# Patient Record
Sex: FEMALE | Race: WHITE | NOT HISPANIC OR LATINO | Employment: OTHER | URBAN - METROPOLITAN AREA
[De-identification: names, ages, dates, MRNs, and addresses within clinical notes are randomized per-mention and may not be internally consistent; named-entity substitution may affect disease eponyms.]

---

## 2023-09-10 PROBLEM — I10 HYPERTENSION: Status: ACTIVE | Noted: 2023-09-10

## 2023-09-10 PROBLEM — N32.89 BLADDER SPASM: Status: ACTIVE | Noted: 2023-09-10

## 2023-09-10 PROBLEM — R60.0 LOWER EXTREMITY EDEMA: Status: ACTIVE | Noted: 2023-09-10

## 2023-09-10 PROBLEM — E03.9 HYPOTHYROIDISM: Status: ACTIVE | Noted: 2023-09-10

## 2023-09-10 PROBLEM — G47.9 SLEEP DIFFICULTIES: Status: ACTIVE | Noted: 2023-09-10

## 2023-09-10 PROBLEM — F32.A DEPRESSION: Status: ACTIVE | Noted: 2023-09-10

## 2023-09-10 PROBLEM — E53.8 VITAMIN B12 DEFICIENCY: Status: ACTIVE | Noted: 2023-09-10

## 2023-09-10 PROBLEM — N32.81 OVERACTIVE BLADDER: Status: ACTIVE | Noted: 2023-09-10

## 2023-09-10 PROBLEM — C50.919 ADENOCARCINOMA OF BREAST (MULTI): Status: ACTIVE | Noted: 2023-09-10

## 2023-09-10 PROBLEM — E11.9 DIABETES MELLITUS TYPE 2, DIET-CONTROLLED (MULTI): Status: ACTIVE | Noted: 2023-09-10

## 2023-09-10 PROBLEM — E78.5 HYPERLIPIDEMIA: Status: ACTIVE | Noted: 2023-09-10

## 2023-09-10 PROBLEM — R73.02 IMPAIRED GLUCOSE TOLERANCE: Status: ACTIVE | Noted: 2023-09-10

## 2023-09-10 RX ORDER — ROSUVASTATIN CALCIUM 20 MG/1
1 TABLET, COATED ORAL NIGHTLY
COMMUNITY
Start: 2017-07-12 | End: 2023-11-06

## 2023-09-10 RX ORDER — CHOLECALCIFEROL (VITAMIN D3) 50 MCG
TABLET ORAL
COMMUNITY

## 2023-09-10 RX ORDER — TROSPIUM CHLORIDE 20 MG/1
1 TABLET, FILM COATED ORAL DAILY
COMMUNITY
Start: 2022-10-25

## 2023-09-10 RX ORDER — TRIAMTERENE/HYDROCHLOROTHIAZID 37.5-25 MG
TABLET ORAL
COMMUNITY
Start: 2016-07-06 | End: 2023-11-27 | Stop reason: ALTCHOICE

## 2023-09-10 RX ORDER — TRAZODONE HYDROCHLORIDE 50 MG/1
TABLET ORAL
COMMUNITY
Start: 2018-05-07

## 2023-09-10 RX ORDER — VALSARTAN 80 MG/1
1 TABLET ORAL 2 TIMES DAILY
COMMUNITY
Start: 2014-10-28 | End: 2023-10-24 | Stop reason: SDUPTHER

## 2023-09-10 RX ORDER — VIBEGRON 75 MG/1
TABLET, FILM COATED ORAL
COMMUNITY

## 2023-09-10 RX ORDER — CYANOCOBALAMIN 1000 UG/ML
1000 INJECTION, SOLUTION INTRAMUSCULAR; SUBCUTANEOUS
COMMUNITY
Start: 2018-07-17

## 2023-09-10 RX ORDER — LEVOTHYROXINE SODIUM 125 UG/1
TABLET ORAL
COMMUNITY
Start: 2021-10-21

## 2023-09-10 RX ORDER — LEVOTHYROXINE SODIUM 137 UG/1
TABLET ORAL
COMMUNITY
Start: 2018-05-07

## 2023-10-24 DIAGNOSIS — I10 ESSENTIAL (PRIMARY) HYPERTENSION: ICD-10-CM

## 2023-10-24 RX ORDER — VALSARTAN 80 MG/1
80 TABLET ORAL 2 TIMES DAILY
Qty: 180 TABLET | Refills: 3 | Status: SHIPPED | OUTPATIENT
Start: 2023-10-24 | End: 2023-11-27 | Stop reason: SDUPTHER

## 2023-10-30 ENCOUNTER — APPOINTMENT (OUTPATIENT)
Dept: CARDIOLOGY | Facility: CLINIC | Age: 88
End: 2023-10-30

## 2023-11-27 ENCOUNTER — OFFICE VISIT (OUTPATIENT)
Dept: CARDIOLOGY | Facility: CLINIC | Age: 88
End: 2023-11-27
Payer: MEDICARE

## 2023-11-27 VITALS
WEIGHT: 159.9 LBS | HEART RATE: 78 BPM | HEIGHT: 66 IN | SYSTOLIC BLOOD PRESSURE: 145 MMHG | OXYGEN SATURATION: 94 % | BODY MASS INDEX: 25.7 KG/M2 | DIASTOLIC BLOOD PRESSURE: 86 MMHG

## 2023-11-27 DIAGNOSIS — I10 HYPERTENSION, UNSPECIFIED TYPE: Primary | ICD-10-CM

## 2023-11-27 DIAGNOSIS — I10 ESSENTIAL (PRIMARY) HYPERTENSION: ICD-10-CM

## 2023-11-27 DIAGNOSIS — E78.5 HYPERLIPIDEMIA, UNSPECIFIED: ICD-10-CM

## 2023-11-27 PROCEDURE — 93005 ELECTROCARDIOGRAM TRACING: CPT | Mod: PO | Performed by: INTERNAL MEDICINE

## 2023-11-27 PROCEDURE — 99214 OFFICE O/P EST MOD 30 MIN: CPT | Performed by: INTERNAL MEDICINE

## 2023-11-27 PROCEDURE — 93010 ELECTROCARDIOGRAM REPORT: CPT | Performed by: INTERNAL MEDICINE

## 2023-11-27 PROCEDURE — 3079F DIAST BP 80-89 MM HG: CPT | Performed by: INTERNAL MEDICINE

## 2023-11-27 PROCEDURE — 1159F MED LIST DOCD IN RCRD: CPT | Performed by: INTERNAL MEDICINE

## 2023-11-27 PROCEDURE — 3077F SYST BP >= 140 MM HG: CPT | Performed by: INTERNAL MEDICINE

## 2023-11-27 PROCEDURE — 1125F AMNT PAIN NOTED PAIN PRSNT: CPT | Performed by: INTERNAL MEDICINE

## 2023-11-27 PROCEDURE — 99214 OFFICE O/P EST MOD 30 MIN: CPT | Mod: PO,25 | Performed by: INTERNAL MEDICINE

## 2023-11-27 RX ORDER — AMLODIPINE BESYLATE 5 MG/1
5 TABLET ORAL DAILY
COMMUNITY
Start: 2023-11-06 | End: 2023-11-27 | Stop reason: WASHOUT

## 2023-11-27 RX ORDER — AMLODIPINE BESYLATE 5 MG/1
5 TABLET ORAL DAILY
Qty: 90 TABLET | Refills: 3 | Status: SHIPPED | OUTPATIENT
Start: 2023-11-27

## 2023-11-27 RX ORDER — ROSUVASTATIN CALCIUM 20 MG/1
20 TABLET, COATED ORAL NIGHTLY
Qty: 90 TABLET | Refills: 3 | Status: SHIPPED | OUTPATIENT
Start: 2023-11-27

## 2023-11-27 RX ORDER — VALSARTAN 80 MG/1
80 TABLET ORAL 2 TIMES DAILY
Qty: 180 TABLET | Refills: 3 | Status: SHIPPED | OUTPATIENT
Start: 2023-11-27

## 2023-11-27 ASSESSMENT — PAIN SCALES - GENERAL: PAINLEVEL: 2

## 2023-11-27 ASSESSMENT — PATIENT HEALTH QUESTIONNAIRE - PHQ9
2. FEELING DOWN, DEPRESSED OR HOPELESS: NOT AT ALL
SUM OF ALL RESPONSES TO PHQ9 QUESTIONS 1 AND 2: 0
1. LITTLE INTEREST OR PLEASURE IN DOING THINGS: NOT AT ALL

## 2023-11-27 ASSESSMENT — ENCOUNTER SYMPTOMS
OCCASIONAL FEELINGS OF UNSTEADINESS: 0
LOSS OF SENSATION IN FEET: 0
DIAPHORESIS: 0
WEIGHT LOSS: 0
ORTHOPNEA: 0
DECREASED APPETITE: 0
NEAR-SYNCOPE: 0
FEVER: 0
DYSPNEA ON EXERTION: 0
DEPRESSION: 0

## 2023-11-27 ASSESSMENT — COLUMBIA-SUICIDE SEVERITY RATING SCALE - C-SSRS
1. IN THE PAST MONTH, HAVE YOU WISHED YOU WERE DEAD OR WISHED YOU COULD GO TO SLEEP AND NOT WAKE UP?: NO
2. HAVE YOU ACTUALLY HAD ANY THOUGHTS OF KILLING YOURSELF?: NO
6. HAVE YOU EVER DONE ANYTHING, STARTED TO DO ANYTHING, OR PREPARED TO DO ANYTHING TO END YOUR LIFE?: NO

## 2023-11-27 NOTE — PROGRESS NOTES
Primary Care Physician: Olga Person  Date of Visit: 2023  9:20 AM EST  Location of visit: [facility] [place] [address]   Last office visit: 2022     Chief Complaint:     Hypertension     HPI/Summary  Citlaly Miller is a 89 y.o. female who presents for followup cardiology evaluation.     The patient presents with hypertension, hyperlipidemia and aortic sclerosis without stenosis.  There is a remote history of breast cancer, with treatment including partial mastectomy and hormonal therapy.  No chemotherapy.  An echocardiogram in  showed normal LV systolic function, mild LVH no significant valvular heart disease.  There is a history of diabetes, diet controlled.    Aspirin has been withdrawn.  She continues on rosuvastatin, valsartan, and triamterene-HCTZ.    The patient is doing well.  She takes her blood pressure at home, and readings are at goal, after the addition of amlodipine.  No chest pain, no significant palpitations.  She did get treated for COVID-19 infection a few months ago.  Specialty Problems          Cardiology Problems    Hyperlipidemia    Hypertension        Past Medical History:   Diagnosis Date    Personal history of malignant neoplasm of breast 2014    History of malignant neoplasm of female breast          Past Surgical History:   Procedure Laterality Date    OTHER SURGICAL HISTORY  2014    Left Breast Partial Mastectomy            Social History     Tobacco Use    Smoking status: Former     Types: Cigarettes     Quit date: 1960     Years since quittin.9    Smokeless tobacco: Never   Substance Use Topics    Alcohol use: Yes     Alcohol/week: 1.0 standard drink of alcohol     Types: 1 Glasses of wine per week     Comment: 1 glass of wine daily    Drug use: Never        Physical Activity: Not on file            Allergies   Allergen Reactions    Ciprofloxacin Unknown    Hydrocodone Unknown    Meperidine Unknown    Oxybutynin Unknown     "Sulfamethoxazole-Trimethoprim Unknown         Current Outpatient Medications   Medication Instructions    amLODIPine (NORVASC) 5 mg, oral, Daily    AMLODIPINE BESYLATE, BULK, MISC miscellaneous    cholecalciferol (Vitamin D-3) 50 MCG (2000 UT) tablet oral    cyanocobalamin (VITAMIN B-12) 1,000 mcg, intramuscular, Every 30 days    fish oil concentrate (Omega-3) 120-180 mg capsule 1 capsule, oral, Daily    levothyroxine (Synthroid, Levoxyl) 125 mcg tablet oral    levothyroxine (Synthroid, Levoxyl) 137 mcg tablet oral    rosuvastatin (CRESTOR) 20 mg, oral, Nightly    traZODone (Desyrel) 50 mg tablet oral    triamterene-hydrochlorothiazid (Maxzide-25) 37.5-25 mg tablet oral, Daily RT    trospium (Sanctura) 20 mg tablet 1 tablet, oral, Daily    valsartan (DIOVAN) 80 mg, oral, 2 times daily    vibegron (Gemtesa) 75 mg tablet oral    vit A/vit C/vit E/zinc/copper (PRESERVISION AREDS ORAL) 1 capsule, oral, 2 times daily       Review of Systems   Constitutional: Negative for decreased appetite, diaphoresis, fever and weight loss.   Cardiovascular:  Positive for leg swelling. Negative for chest pain, dyspnea on exertion, near-syncope and orthopnea.   All other systems reviewed and are negative.       Vital Signs:  Vitals:    11/27/23 0945   BP: (!) 170/95   BP Location: Right arm   Patient Position: Sitting   BP Cuff Size: Adult   Pulse: 78   SpO2: 94%   Weight: 72.5 kg (159 lb 14.4 oz)   Height: 1.676 m (5' 6\")     Wt Readings from Last 5 Encounters:   11/27/23 72.5 kg (159 lb 14.4 oz)   02/10/23 72.3 kg (159 lb 8 oz)   10/25/22 70.9 kg (156 lb 4 oz)   10/25/22 71.2 kg (157 lb)   10/24/22 70.8 kg (156 lb)     Body mass index is 25.81 kg/m².   Home blood pressures have been well-controlled, 128/76 on average, after the addition of amlodipine.  Physical Exam:    On examination she appeared in good health and spirits. Vital signs as documented. Skin warm and dry and without overt rashes. Neck without JVD. Lungs clear. Heart " exam notable for regular rhythm, normal sounds and absence of murmurs, rubs or gallops. Abdomen unremarkable and without evidence of organomegaly, masses, or abdominal aortic enlargement. Extremities nonedematous.     Last Labs:  CMP: October 7, 2023, outside lab.  All normal except sodium 135.  CBC:   COAG:                                                                                                                      HEME/ENDO:    TSH 0.88.                CARDIAC:       Lipid panel shows cholesterol 168, HDL 78, LDL 71, triglycerides 95.                                                                                                                                                                                                                                                                                                                                                                                                                                                                                              Last Cardiology Tests:    ECG:    Performed today reviewed shows sinus rhythm with first-degree AV block, left atrial enlargement, otherwise normal.  Peer interval 252 ms.    Echo:  Echo Results:  No results found for this or any previous visit from the past 3650 days.       Cath:      Stress Test:  Stress Results:  No results found for this or any previous visit from the past 365 days.         Cardiac Imaging:        Assessment/Plan     Currently, the patient is doing well.  Blood pressures have been generally well-controlled.  She has a primary care provider locally but prefers to see us once yearly.  We reviewed outside laboratory testing which show adequate control of dyslipidemia for primary prevention.  Follow-up on as-needed basis.  Medications will be renewed.  Note repeat blood pressure 145/86 after resting.      Orders:  Orders Placed This Encounter   Procedures    ECG 12 lead (Clinic Performed)       Followup Appts:  No future appointments.        ____________________________________________________________  Himanshu Montez MD    Senior Attending Physician  [facility]  [facility]    [facility] Chair for Cardiovascular Excellence  [employer]

## 2023-12-02 LAB
ATRIAL RATE: 77 BPM
P AXIS: 61 DEGREES
P OFFSET: 164 MS
P ONSET: 94 MS
PR INTERVAL: 252 MS
Q ONSET: 220 MS
QRS COUNT: 12 BEATS
QRS DURATION: 102 MS
QT INTERVAL: 384 MS
QTC CALCULATION(BAZETT): 434 MS
QTC FREDERICIA: 417 MS
R AXIS: 58 DEGREES
T AXIS: 59 DEGREES
T OFFSET: 412 MS
VENTRICULAR RATE: 77 BPM

## 2024-02-09 ENCOUNTER — OFFICE VISIT (OUTPATIENT)
Dept: UROLOGY | Facility: CLINIC | Age: 89
End: 2024-02-09
Payer: MEDICARE

## 2024-02-09 VITALS
DIASTOLIC BLOOD PRESSURE: 83 MMHG | TEMPERATURE: 98 F | SYSTOLIC BLOOD PRESSURE: 125 MMHG | HEART RATE: 80 BPM | WEIGHT: 164 LBS | HEIGHT: 66 IN | BODY MASS INDEX: 26.36 KG/M2

## 2024-02-09 DIAGNOSIS — N32.89 BLADDER SPASM: Primary | ICD-10-CM

## 2024-02-09 DIAGNOSIS — N39.0 RECURRENT UTI: ICD-10-CM

## 2024-02-09 LAB
POC APPEARANCE, URINE: CLEAR
POC BILIRUBIN, URINE: ABNORMAL
POC BLOOD, URINE: ABNORMAL
POC COLOR, URINE: YELLOW
POC GLUCOSE, URINE: NEGATIVE MG/DL
POC KETONES, URINE: ABNORMAL MG/DL
POC LEUKOCYTES, URINE: ABNORMAL
POC NITRITE,URINE: NEGATIVE
POC PH, URINE: 5.5 PH
POC PROTEIN, URINE: NEGATIVE MG/DL
POC SPECIFIC GRAVITY, URINE: 1.02
POC UROBILINOGEN, URINE: 0.2 EU/DL

## 2024-02-09 PROCEDURE — 81002 URINALYSIS NONAUTO W/O SCOPE: CPT | Performed by: OBSTETRICS & GYNECOLOGY

## 2024-02-09 PROCEDURE — 87186 SC STD MICRODIL/AGAR DIL: CPT

## 2024-02-09 PROCEDURE — 1036F TOBACCO NON-USER: CPT | Performed by: OBSTETRICS & GYNECOLOGY

## 2024-02-09 PROCEDURE — 3074F SYST BP LT 130 MM HG: CPT | Performed by: OBSTETRICS & GYNECOLOGY

## 2024-02-09 PROCEDURE — 1159F MED LIST DOCD IN RCRD: CPT | Performed by: OBSTETRICS & GYNECOLOGY

## 2024-02-09 PROCEDURE — 87086 URINE CULTURE/COLONY COUNT: CPT

## 2024-02-09 PROCEDURE — 3079F DIAST BP 80-89 MM HG: CPT | Performed by: OBSTETRICS & GYNECOLOGY

## 2024-02-09 PROCEDURE — 1126F AMNT PAIN NOTED NONE PRSNT: CPT | Performed by: OBSTETRICS & GYNECOLOGY

## 2024-02-09 PROCEDURE — 99204 OFFICE O/P NEW MOD 45 MIN: CPT | Performed by: OBSTETRICS & GYNECOLOGY

## 2024-02-09 RX ORDER — PHENAZOPYRIDINE HYDROCHLORIDE 100 MG/1
TABLET, FILM COATED ORAL
Qty: 90 TABLET | Refills: 3 | Status: SHIPPED | OUTPATIENT
Start: 2024-02-09

## 2024-02-09 RX ORDER — NITROFURANTOIN 25; 75 MG/1; MG/1
100 CAPSULE ORAL DAILY
Qty: 90 CAPSULE | Refills: 0 | Status: SHIPPED | OUTPATIENT
Start: 2024-02-09 | End: 2024-03-11

## 2024-02-09 ASSESSMENT — PAIN SCALES - GENERAL: PAINLEVEL: 0-NO PAIN

## 2024-02-09 NOTE — PROGRESS NOTES
INITIAL EVALUATION       PROBLEM LIST:  1. Recurrent UTIs       HISTORY OF PRESENT ILLNESS:   Citlaly Miller is a 89 y.o. female who was first seen on 02/09/24 for evaluation of recurrent UTIs and OAB. She has tried many different medications for her OAB. Her best results are when she keeps her bladder empty. Her urine cultures show  e. Coli typically.    She is not currently on any medications. She is having some odd symptoms after covid. She is still using the vaginal estrogen. The last medications she was on is the Gemtesa. She has not noticed any change in her symptoms. She took the whole two month course she was prescribe and there was no change in her spasms. She adds the only change in spasms she notices is when she keeps her bladder empty. She has not tried Pyridium yet.     INTERVAL HISTORY:  History of Cdiff.     PAST MEDICAL HISTORY:  Past Medical History:   Diagnosis Date    Personal history of malignant neoplasm of breast 06/25/2014    History of malignant neoplasm of female breast       PAST SURGICAL HISTORY:  Past Surgical History:   Procedure Laterality Date    OTHER SURGICAL HISTORY  06/25/2014    Left Breast Partial Mastectomy        ALLERGIES:   Allergies   Allergen Reactions    Ciprofloxacin Unknown    Hydrocodone Unknown    Meperidine Unknown    Oxybutynin Unknown    Sulfamethoxazole-Trimethoprim Unknown        MEDICATIONS:   [unfilled]     SOCIAL HISTORY:  Patient  reports that she quit smoking about 64 years ago. Her smoking use included cigarettes. She has never used smokeless tobacco. She reports current alcohol use of about 1.0 standard drink of alcohol per week. She reports that she does not use drugs.   Social History     Socioeconomic History    Marital status:      Spouse name: Not on file    Number of children: Not on file    Years of education: Not on file    Highest education level: Not on file   Occupational History    Not on file   Tobacco Use    Smoking status: Former      "Types: Cigarettes     Quit date: 1960     Years since quittin.1    Smokeless tobacco: Never   Substance and Sexual Activity    Alcohol use: Yes     Alcohol/week: 1.0 standard drink of alcohol     Types: 1 Glasses of wine per week     Comment: 1 glass of wine daily    Drug use: Never    Sexual activity: Not on file   Other Topics Concern    Not on file   Social History Narrative    Not on file     Social Determinants of Health     Financial Resource Strain: Not on file   Food Insecurity: Not on file   Transportation Needs: Not on file   Physical Activity: Not on file   Stress: Not on file   Social Connections: Not on file   Intimate Partner Violence: Not on file   Housing Stability: Not on file       FAMILY HISTORY:  Family History   Problem Relation Name Age of Onset    Diabetes Mother      Heart failure Father      Heart disease Father      Diabetes Brother      Prostate cancer Brother         REVIEW OF SYSTEMS:  Constitutional: Negative for fever and chills. Denies anorexia, weight loss.  Eyes: Negative for visual disturbance.   Respiratory: Negative for shortness of breath.    Cardiovascular: Negative for chest pain.   Gastrointestinal: Negative for nausea and vomiting.   Genitourinary: See interval history above.  Skin: Negative for rash.   Neurological: Negative for dizziness and numbness.   Psychiatric/Behavioral: Negative for confusion and decreased concentration.     PHYSICAL EXAM:  Blood pressure 125/83, pulse 80, temperature 36.7 °C (98 °F), height 1.676 m (5' 6\"), weight 74.4 kg (164 lb).  Constitutional: Patient appears well-developed and well-nourished. No distress.    Head: Normocephalic and atraumatic.    Neck: Normal range of motion.    Cardiovascular: Normal rate.    Pulmonary/Chest: Effort normal. No respiratory distress.     Musculoskeletal: Normal range of motion.    Neurological: Alert and oriented to person, place, and time.  Psychiatric: Normal mood and affect. Behavior is normal. Thought " content normal.         Assessment:      1. Bladder spasm  POCT UA (nonautomated) manually resulted        Citlaly Miller is a 89 y.o. female with recurrent UTIs and OAB    We are going to try Macrobid 100mg for 90 days for UTI suppression. After that we will start her on Methenamine. For her bladder spasms we are going to try Pyridium 100mg PRN.     Plan:    Prescribed Macrobid 100mg 90 days  Prescribed Pyridium 100mg PRN      Scribe Attestation  By signing my name below, I, Bandar Ceron   attest that this documentation has been prepared under the direction and in the presence of Betty Stewart MD MPH.

## 2024-02-12 LAB — BACTERIA UR CULT: ABNORMAL

## 2024-03-11 DIAGNOSIS — N39.0 RECURRENT UTI: ICD-10-CM

## 2024-03-11 RX ORDER — NITROFURANTOIN 25; 75 MG/1; MG/1
100 CAPSULE ORAL DAILY
Qty: 90 CAPSULE | Refills: 0 | Status: SHIPPED | OUTPATIENT
Start: 2024-03-11

## 2024-04-19 ENCOUNTER — TELEPHONE (OUTPATIENT)
Dept: SCHEDULING | Age: 89
End: 2024-04-19
Payer: MEDICARE

## 2024-04-19 DIAGNOSIS — I10 PRIMARY HYPERTENSION: Primary | ICD-10-CM

## 2024-04-19 RX ORDER — TRIAMTERENE AND HYDROCHLOROTHIAZIDE 37.5; 25 MG/1; MG/1
1 CAPSULE ORAL EVERY MORNING
Qty: 90 CAPSULE | Refills: 1 | Status: SHIPPED | OUTPATIENT
Start: 2024-04-19

## 2024-04-19 NOTE — TELEPHONE ENCOUNTER
Reviewed meds.  Some confusion.  We started amlodipine 5 mg daily last July, but when she was in the office in November, amlodipine was not on the list and triamterene-HCTZ was on the list.  She is taking the amlodipine but not the diuretic, and blood pressures are around 130/85 on average.  Since she had tolerated the thiazide diuretic in the past, we will do triamterene-HCTZ and continue amlodipine 5 mg daily.  She will monitor her blood pressure and will be in touch.  We could reduce amlodipine to 2.5 mg daily, if necessary

## 2024-05-10 ENCOUNTER — OFFICE VISIT (OUTPATIENT)
Dept: UROLOGY | Facility: CLINIC | Age: 89
End: 2024-05-10
Payer: MEDICARE

## 2024-05-10 VITALS
TEMPERATURE: 97.5 F | WEIGHT: 165 LBS | SYSTOLIC BLOOD PRESSURE: 113 MMHG | HEIGHT: 66 IN | DIASTOLIC BLOOD PRESSURE: 71 MMHG | BODY MASS INDEX: 26.52 KG/M2 | HEART RATE: 88 BPM

## 2024-05-10 DIAGNOSIS — N39.0 RECURRENT UTI: ICD-10-CM

## 2024-05-10 DIAGNOSIS — R35.0 URINARY FREQUENCY: Primary | ICD-10-CM

## 2024-05-10 DIAGNOSIS — N32.89 BLADDER SPASM: ICD-10-CM

## 2024-05-10 LAB
APPEARANCE UR: ABNORMAL
BACTERIA #/AREA URNS AUTO: ABNORMAL /HPF
BILIRUB UR STRIP.AUTO-MCNC: NEGATIVE MG/DL
COLOR UR: YELLOW
GLUCOSE UR STRIP.AUTO-MCNC: NORMAL MG/DL
KETONES UR STRIP.AUTO-MCNC: NEGATIVE MG/DL
LEUKOCYTE ESTERASE UR QL STRIP.AUTO: ABNORMAL
MUCOUS THREADS #/AREA URNS AUTO: ABNORMAL /LPF
NITRITE UR QL STRIP.AUTO: NEGATIVE
PH UR STRIP.AUTO: 5.5 [PH]
POC APPEARANCE, URINE: ABNORMAL
POC BILIRUBIN, URINE: NEGATIVE
POC BLOOD, URINE: ABNORMAL
POC COLOR, URINE: YELLOW
POC GLUCOSE, URINE: NEGATIVE MG/DL
POC KETONES, URINE: NEGATIVE MG/DL
POC LEUKOCYTES, URINE: ABNORMAL
POC NITRITE,URINE: NEGATIVE
POC PH, URINE: 6 PH
POC PROTEIN, URINE: NEGATIVE MG/DL
POC SPECIFIC GRAVITY, URINE: 1.02
POC UROBILINOGEN, URINE: 0.2 EU/DL
PROT UR STRIP.AUTO-MCNC: NEGATIVE MG/DL
RBC # UR STRIP.AUTO: NEGATIVE /UL
RBC #/AREA URNS AUTO: ABNORMAL /HPF
SP GR UR STRIP.AUTO: 1.01
SQUAMOUS #/AREA URNS AUTO: ABNORMAL /HPF
UROBILINOGEN UR STRIP.AUTO-MCNC: NORMAL MG/DL
WBC #/AREA URNS AUTO: >50 /HPF
WBC CLUMPS #/AREA URNS AUTO: ABNORMAL /HPF

## 2024-05-10 PROCEDURE — 1159F MED LIST DOCD IN RCRD: CPT | Performed by: NURSE PRACTITIONER

## 2024-05-10 PROCEDURE — 81003 URINALYSIS AUTO W/O SCOPE: CPT | Performed by: NURSE PRACTITIONER

## 2024-05-10 PROCEDURE — 87186 SC STD MICRODIL/AGAR DIL: CPT

## 2024-05-10 PROCEDURE — 81001 URINALYSIS AUTO W/SCOPE: CPT

## 2024-05-10 PROCEDURE — 3078F DIAST BP <80 MM HG: CPT | Performed by: NURSE PRACTITIONER

## 2024-05-10 PROCEDURE — 87086 URINE CULTURE/COLONY COUNT: CPT

## 2024-05-10 PROCEDURE — G2211 COMPLEX E/M VISIT ADD ON: HCPCS | Performed by: NURSE PRACTITIONER

## 2024-05-10 PROCEDURE — 99213 OFFICE O/P EST LOW 20 MIN: CPT | Performed by: NURSE PRACTITIONER

## 2024-05-10 PROCEDURE — 1036F TOBACCO NON-USER: CPT | Performed by: NURSE PRACTITIONER

## 2024-05-10 PROCEDURE — 3074F SYST BP LT 130 MM HG: CPT | Performed by: NURSE PRACTITIONER

## 2024-05-10 RX ORDER — ONDANSETRON 4 MG/1
TABLET, FILM COATED ORAL
COMMUNITY
Start: 2024-05-07

## 2024-05-10 RX ORDER — METHENAMINE HIPPURATE 1000 MG/1
1 TABLET ORAL 2 TIMES DAILY
Qty: 180 TABLET | Refills: 3 | Status: SHIPPED | OUTPATIENT
Start: 2024-05-10 | End: 2025-05-10

## 2024-05-10 NOTE — PATIENT INSTRUCTIONS
Urine culture sent will call daughter Jyane next week w results  Finish macrobid 100 mg nightly has approximately 20 left  Then Start methenamine hippurate twice daily  Needs to be taking approx 500 mg vitamin C daily while taking methenamine hippurate  Follow up 4 mos Dr. Stewart or myself  whichever works w her schedule  Nurse line [phone]

## 2024-05-10 NOTE — PROGRESS NOTES
"05/10/24   [id number]    Recurrent UTI, OAB      Subjective      HPI Citlaly Miller is a 89 y.o. female who presents for follow up Recurrent UTI, OAB;   Last seen 10/13/23;     Saw Dr. Stewart 2/9/24 completing 90 days of Macrobid 100mg for 90 days for UTI suppression. With plant to start after that Methenamine. For her bladder spasms we was given Pyridium 100mg PRN by Dr. Stewart.     UA trace heme, sm leuk; today, discussed may be infected, not sure from dip, urine was cloudy;     Hasn't been treated for any other UTIs, never has sx w UTIs;  per patient hasn't taken much of azo as only bladder spasms prior to needing to urinate;     Has 20 days left of macrobid, hx cdiff, only diarrhea has been related to food once but felt it was food poisoning on trip where others had it as well;     Son w patient, daughter Jayne PECK assisting w health care;     From previous notes: failed trospium d/t constipation, failed Gemtesa d/t ineffective;            Objective     /71   Pulse 88   Temp 36.4 °C (97.5 °F)   Ht 1.676 m (5' 6\")   Wt 74.8 kg (165 lb)   BMI 26.63 kg/m²    Physical Exam  General: Appears comfortable and in no apparent distress, well nourished  Head: Normocephalic, atraumatic  Neck: trachea midline  Respiratory: respirations unlabored, no wheezes, and no use of accessory muscles  Cardiovascular: at rest no dyspnea, well perfused  Skin: no visible rashes or lesions  Neurologic: grossly intact, oriented to person, place, and time  Psychiatric: mood and affect appropriate  Musculoskeletal: in chair for appt. no difficulty w upper body movement      Assessment/Plan   Problem List Items Addressed This Visit          Genitourinary and Reproductive    Bladder spasm     Other Visit Diagnoses       Urinary frequency    -  Primary    Relevant Orders    POCT UA Automated manually resulted (Completed)    Post-Void Residual (Completed)    Urine Culture    Urinalysis with Reflex Microscopic    Recurrent UTI        Relevant " "Medications    methenamine hippurate (Hiprex) 1 gram tablet    Other Relevant Orders    Urine Culture    Urinalysis with Reflex Microscopic          Orders Placed This Encounter   Procedures    Post-Void Residual    Urine Culture     Standing Status:   Future     Number of Occurrences:   1     Standing Expiration Date:   5/10/2025     Order Specific Question:   Release result to [facility]     Answer:   Immediate [1]    Urinalysis with Reflex Microscopic     Standing Status:   Future     Number of Occurrences:   1     Standing Expiration Date:   5/10/2025     Order Specific Question:   Release result to [facility]     Answer:   Immediate [1]    POCT UA Automated manually resulted     Order Specific Question:   Release result to [facility]     Answer:   Immediate [1]      Urine culture sent will call daughter Jayne next week w results  Finish macrobid 100 mg nightly has approximately 20 left  Then Start methenamine hippurate twice daily  Needs to be taking approx 500 mg vitamin C daily while taking methenamine hippurate  Follow up 4 mos Dr. Stewart or myself  whichever works w her schedule  Nurse line [phone]       Siria Candelario, APRN-CNP  No results found for: \"GLUCOSE\", \"CALCIUM\", \"NA\", \"K\", \"CO2\", \"CL\", \"BUN\", \"CREATININE\"    "

## 2024-05-12 LAB — BACTERIA UR CULT: ABNORMAL

## 2024-05-13 DIAGNOSIS — N39.0 RECURRENT UTI: Primary | ICD-10-CM

## 2024-05-13 RX ORDER — CEPHALEXIN 500 MG/1
500 CAPSULE ORAL 3 TIMES DAILY
Qty: 15 CAPSULE | Refills: 0 | Status: SHIPPED | OUTPATIENT
Start: 2024-05-13 | End: 2024-05-18

## 2024-09-12 ENCOUNTER — APPOINTMENT (OUTPATIENT)
Dept: UROLOGY | Facility: CLINIC | Age: 89
End: 2024-09-12
Payer: MEDICARE

## 2024-10-10 DIAGNOSIS — I10 PRIMARY HYPERTENSION: ICD-10-CM

## 2024-10-10 RX ORDER — TRIAMTERENE AND HYDROCHLOROTHIAZIDE 37.5; 25 MG/1; MG/1
1 CAPSULE ORAL EVERY MORNING
Qty: 90 CAPSULE | Refills: 1 | Status: SHIPPED | OUTPATIENT
Start: 2024-10-10

## 2024-12-01 NOTE — PROGRESS NOTES
Primary Care Physician: Olga Person, APRN-CNP  Date of Visit: 2024  9:40 AM EST  [place] [facility] [place] [address]   Last office visit: 2023     Chief Complaint:     Hypertension, hypercholesterolemia    HPI/Summary  Citlaly Miller is a 89 y.o. female who presents for followup cardiology evaluation.     The patient presents with hypertension, hyperlipidemia and aortic sclerosis without stenosis. There is a remote history of breast cancer, with treatment including partial mastectomy and hormonal therapy. No chemotherapy. An echocardiogram in  showed normal LV systolic function, mild LVH no significant valvular heart disease. There is a history of diabetes, diet controlled.     She continues on amlodipine 5 mg daily, rosuvastatin 20 mg daily, valsartan 80 mg twice daily and Dyazide 1 capsule daily.    Stable cardiovascular status.  She still lives independently, no longer drives a car, has some help in the home.  Some short-term memory loss.  Compliant with medications.  No falls.    Specialty Problems          Cardiology Problems    Hyperlipidemia    Hypertension        Past Medical History:   Diagnosis Date    Personal history of malignant neoplasm of breast 2014    History of malignant neoplasm of female breast          Past Surgical History:   Procedure Laterality Date    OTHER SURGICAL HISTORY  2014    Left Breast Partial Mastectomy            Social History     Tobacco Use    Smoking status: Former     Current packs/day: 0.00     Types: Cigarettes     Quit date: 1960     Years since quittin.9    Smokeless tobacco: Never   Substance Use Topics    Alcohol use: Yes     Alcohol/week: 1.0 standard drink of alcohol     Types: 1 Glasses of wine per week     Comment: 1 glass of wine daily    Drug use: Never                 Allergies   Allergen Reactions    Ciprofloxacin Unknown    Hydrocodone Unknown    Meperidine Unknown    Oxybutynin Unknown    Sulfamethoxazole-Trimethoprim Unknown  "        Current Outpatient Medications   Medication Instructions    amLODIPine (NORVASC) 5 mg, oral, Daily    cholecalciferol (Vitamin D-3) 50 MCG (2000 UT) tablet Take by mouth.    cyanocobalamin (VITAMIN B-12) 1,000 mcg, Every 30 days    fish oil concentrate (Omega-3) 120-180 mg capsule 1 capsule, Daily    levothyroxine (Synthroid, Levoxyl) 125 mcg tablet Take by mouth.    levothyroxine (Synthroid, Levoxyl) 137 mcg tablet Take by mouth.    ondansetron (Zofran) 4 mg tablet     rosuvastatin (CRESTOR) 20 mg, oral, Nightly    traZODone (Desyrel) 50 mg tablet Take by mouth.    triamterene-hydrochlorothiazid (Dyazide) 37.5-25 mg capsule 1 capsule, oral, 3 times weekly    valsartan (DIOVAN) 80 mg, oral, 2 times daily    vit A/vit C/vit E/zinc/copper (PRESERVISION AREDS ORAL) 1 capsule, 2 times daily       ROS     Vital Signs:  Vitals:    12/02/24 0957   BP: 131/81   BP Location: Right arm   Patient Position: Sitting   BP Cuff Size: Large adult   Pulse: 73   SpO2: 95%   Weight: 73.2 kg (161 lb 6.4 oz)   Height: 1.689 m (5' 6.5\")     Wt Readings from Last 2 Encounters:   12/02/24 73.2 kg (161 lb 6.4 oz)   05/10/24 74.8 kg (165 lb)     Body mass index is 25.66 kg/m².       Physical Exam:    Pleasant elderly woman, not in any acute distress.  No carotid bruits.  Clear lung fields.  Heart sounds regular, with grade 2 early systolic ejection murmur along LVOT.  S2 preserved, no diastolic murmur.  No edema, no signs of PAD.     Last Labs:  CMP:No results for input(s): \"NA\", \"K\", \"CL\", \"CO2\", \"ANIONGAP\", \"BUN\", \"CREATININE\", \"EGFR\", \"GLUCOSE\" in the last 17923 hours.No results for input(s): \"ALBUMIN\", \"ALKPHOS\", \"ALT\", \"AST\", \"BILITOT\", \"LIPASE\" in the last 75458 hours.    No lab exists for component: \"CA\"  CBC:No results for input(s): \"WBC\", \"HGB\", \"HCT\", \"PLT\", \"MCV\" in the last 38671 hours.  COAG: No results for input(s): \"INR\", \"DDIMERVTE\" in the last 59687 hours.  HEME/ENDO:No results for input(s): \"FERRITIN\", \"IRONSAT\", " "\"TSH\", \"HGBA1C\" in the last 33383 hours.   CARDIAC: No results for input(s): \"LDH\", \"CKMB\", \"TROPHS\", \"BNP\" in the last 59059 hours.    No lab exists for component: \"CK\", \"CKMBP\"No results for input(s): \"CHOL\", \"LDLF\", \"HDL\", \"TRIG\" in the last 50635 hours.    Last Cardiology Tests:    ECG:    Sinus with first-degree AV block, NJ interval 260 ms.  Otherwise normal.    Echo:  Echo Results:  No results found for this or any previous visit from the past 3650 days.       Cath:      Stress Test:  Stress Results:  No results found for this or any previous visit from the past 365 days.         Cardiac Imaging:        Assessment/Plan     Blood pressures have been well-controlled.  She follows with a primary care provider locally.  No recent laboratory work in the EMR, so we will check a lipid panel and a renal function panel.  Medications will be renewed, and she will return annually at her request.      Orders:  Orders Placed This Encounter   Procedures    Renal Function Panel    Cholesterol    Cholesterol, LDL Direct    Cholesterol, HDL    ECG 12 lead (Clinic Performed)      Followup Appts:  Future Appointments   Date Time Provider Department Center   12/1/2025 10:40 AM Himanshu Montez MD [id number] [facility]             ____________________________________________________________  Himanshu Montez MD    Senior Attending Physician  [facility]  [facility]    [facility] Chair for Cardiovascular Excellence  [facility] [employer]  "

## 2024-12-02 ENCOUNTER — LAB (OUTPATIENT)
Dept: LAB | Facility: LAB | Age: 89
End: 2024-12-02
Payer: MEDICARE

## 2024-12-02 ENCOUNTER — APPOINTMENT (OUTPATIENT)
Dept: CARDIOLOGY | Facility: CLINIC | Age: 89
End: 2024-12-02
Payer: MEDICARE

## 2024-12-02 VITALS
HEART RATE: 73 BPM | SYSTOLIC BLOOD PRESSURE: 131 MMHG | OXYGEN SATURATION: 95 % | DIASTOLIC BLOOD PRESSURE: 81 MMHG | WEIGHT: 161.4 LBS | HEIGHT: 67 IN | BODY MASS INDEX: 25.33 KG/M2

## 2024-12-02 DIAGNOSIS — I10 HYPERTENSION, UNSPECIFIED TYPE: ICD-10-CM

## 2024-12-02 DIAGNOSIS — I10 PRIMARY HYPERTENSION: ICD-10-CM

## 2024-12-02 DIAGNOSIS — E78.5 HYPERLIPIDEMIA, UNSPECIFIED: ICD-10-CM

## 2024-12-02 DIAGNOSIS — I10 ESSENTIAL (PRIMARY) HYPERTENSION: ICD-10-CM

## 2024-12-02 DIAGNOSIS — I10 HYPERTENSION, UNSPECIFIED TYPE: Primary | ICD-10-CM

## 2024-12-02 LAB
ALBUMIN SERPL BCP-MCNC: 4.2 G/DL (ref 3.4–5)
ANION GAP SERPL CALC-SCNC: 12 MMOL/L (ref 10–20)
ATRIAL RATE: 73 BPM
BUN SERPL-MCNC: 24 MG/DL (ref 6–23)
CALCIUM SERPL-MCNC: 9.8 MG/DL (ref 8.6–10.6)
CHLORIDE SERPL-SCNC: 102 MMOL/L (ref 98–107)
CHOLEST SERPL-MCNC: 238 MG/DL (ref 0–199)
CO2 SERPL-SCNC: 28 MMOL/L (ref 21–32)
CREAT SERPL-MCNC: 1.13 MG/DL (ref 0.5–1.05)
EGFRCR SERPLBLD CKD-EPI 2021: 46 ML/MIN/1.73M*2
GLUCOSE SERPL-MCNC: 132 MG/DL (ref 74–99)
HDLC SERPL-MCNC: 69.8 MG/DL
LDLC SERPL DIRECT ASSAY-MCNC: 135 MG/DL (ref 0–129)
P AXIS: 48 DEGREES
P OFFSET: 153 MS
P ONSET: 88 MS
PHOSPHATE SERPL-MCNC: 3 MG/DL (ref 2.5–4.9)
POTASSIUM SERPL-SCNC: 4.4 MMOL/L (ref 3.5–5.3)
PR INTERVAL: 260 MS
Q ONSET: 218 MS
QRS COUNT: 12 BEATS
QRS DURATION: 108 MS
QT INTERVAL: 396 MS
QTC CALCULATION(BAZETT): 436 MS
QTC FREDERICIA: 423 MS
R AXIS: -7 DEGREES
SODIUM SERPL-SCNC: 138 MMOL/L (ref 136–145)
T AXIS: 28 DEGREES
T OFFSET: 416 MS
VENTRICULAR RATE: 73 BPM

## 2024-12-02 PROCEDURE — 1159F MED LIST DOCD IN RCRD: CPT | Performed by: INTERNAL MEDICINE

## 2024-12-02 PROCEDURE — 1036F TOBACCO NON-USER: CPT | Performed by: INTERNAL MEDICINE

## 2024-12-02 PROCEDURE — 99214 OFFICE O/P EST MOD 30 MIN: CPT | Performed by: INTERNAL MEDICINE

## 2024-12-02 PROCEDURE — 3075F SYST BP GE 130 - 139MM HG: CPT | Performed by: INTERNAL MEDICINE

## 2024-12-02 PROCEDURE — 1126F AMNT PAIN NOTED NONE PRSNT: CPT | Performed by: INTERNAL MEDICINE

## 2024-12-02 PROCEDURE — 93005 ELECTROCARDIOGRAM TRACING: CPT | Performed by: INTERNAL MEDICINE

## 2024-12-02 PROCEDURE — G2211 COMPLEX E/M VISIT ADD ON: HCPCS | Performed by: INTERNAL MEDICINE

## 2024-12-02 PROCEDURE — 3079F DIAST BP 80-89 MM HG: CPT | Performed by: INTERNAL MEDICINE

## 2024-12-02 PROCEDURE — 36415 COLL VENOUS BLD VENIPUNCTURE: CPT

## 2024-12-02 RX ORDER — VALSARTAN 80 MG/1
80 TABLET ORAL 2 TIMES DAILY
Qty: 180 TABLET | Refills: 3 | Status: SHIPPED | OUTPATIENT
Start: 2024-12-02

## 2024-12-02 RX ORDER — TRIAMTERENE AND HYDROCHLOROTHIAZIDE 37.5; 25 MG/1; MG/1
1 CAPSULE ORAL 3 TIMES WEEKLY
Qty: 90 CAPSULE | Refills: 1 | Status: SHIPPED | OUTPATIENT
Start: 2024-12-02

## 2024-12-02 RX ORDER — ROSUVASTATIN CALCIUM 20 MG/1
20 TABLET, COATED ORAL NIGHTLY
Qty: 90 TABLET | Refills: 3 | Status: SHIPPED | OUTPATIENT
Start: 2024-12-02

## 2024-12-02 RX ORDER — AMLODIPINE BESYLATE 5 MG/1
5 TABLET ORAL DAILY
Qty: 90 TABLET | Refills: 3 | Status: SHIPPED | OUTPATIENT
Start: 2024-12-02

## 2024-12-02 RX ORDER — TRIAMTERENE AND HYDROCHLOROTHIAZIDE 37.5; 25 MG/1; MG/1
1 CAPSULE ORAL 3 TIMES WEEKLY
Qty: 90 CAPSULE | Refills: 1 | Status: SHIPPED | OUTPATIENT
Start: 2024-12-02 | End: 2024-12-02 | Stop reason: SDUPTHER

## 2024-12-02 ASSESSMENT — PATIENT HEALTH QUESTIONNAIRE - PHQ9
2. FEELING DOWN, DEPRESSED OR HOPELESS: NOT AT ALL
1. LITTLE INTEREST OR PLEASURE IN DOING THINGS: NOT AT ALL
SUM OF ALL RESPONSES TO PHQ9 QUESTIONS 1 AND 2: 0

## 2024-12-02 ASSESSMENT — ENCOUNTER SYMPTOMS
DEPRESSION: 0
LOSS OF SENSATION IN FEET: 0
OCCASIONAL FEELINGS OF UNSTEADINESS: 0

## 2024-12-02 ASSESSMENT — COLUMBIA-SUICIDE SEVERITY RATING SCALE - C-SSRS
2. HAVE YOU ACTUALLY HAD ANY THOUGHTS OF KILLING YOURSELF?: NO
1. IN THE PAST MONTH, HAVE YOU WISHED YOU WERE DEAD OR WISHED YOU COULD GO TO SLEEP AND NOT WAKE UP?: NO
6. HAVE YOU EVER DONE ANYTHING, STARTED TO DO ANYTHING, OR PREPARED TO DO ANYTHING TO END YOUR LIFE?: NO

## 2024-12-02 ASSESSMENT — PAIN SCALES - GENERAL: PAINLEVEL_OUTOF10: 0-NO PAIN

## 2024-12-18 LAB
ATRIAL RATE: 73 BPM
P AXIS: 48 DEGREES
P OFFSET: 153 MS
P ONSET: 88 MS
PR INTERVAL: 260 MS
Q ONSET: 218 MS
QRS COUNT: 12 BEATS
QRS DURATION: 108 MS
QT INTERVAL: 396 MS
QTC CALCULATION(BAZETT): 436 MS
QTC FREDERICIA: 423 MS
R AXIS: -7 DEGREES
T AXIS: 28 DEGREES
T OFFSET: 416 MS
VENTRICULAR RATE: 73 BPM

## 2025-12-01 ENCOUNTER — APPOINTMENT (OUTPATIENT)
Dept: CARDIOLOGY | Facility: CLINIC | Age: OVER 89
End: 2025-12-01
Payer: MEDICARE